# Patient Record
Sex: MALE | Race: WHITE
[De-identification: names, ages, dates, MRNs, and addresses within clinical notes are randomized per-mention and may not be internally consistent; named-entity substitution may affect disease eponyms.]

---

## 2017-09-30 ENCOUNTER — HOSPITAL ENCOUNTER (OUTPATIENT)
Dept: HOSPITAL 75 - LAB | Age: 22
End: 2017-09-30
Attending: NURSE PRACTITIONER
Payer: COMMERCIAL

## 2017-09-30 DIAGNOSIS — J02.9: Primary | ICD-10-CM

## 2017-09-30 LAB
BASOPHILS # BLD AUTO: 0 10^3/UL (ref 0–0.1)
BASOPHILS NFR BLD AUTO: 0 % (ref 0–10)
EOSINOPHIL # BLD AUTO: 0 10^3/UL (ref 0–0.3)
EOSINOPHIL NFR BLD AUTO: 0 % (ref 0–10)
ERYTHROCYTE [DISTWIDTH] IN BLOOD BY AUTOMATED COUNT: 12.9 % (ref 10–14.5)
LYMPHOCYTES # BLD AUTO: 1.2 X 10^3 (ref 1–4)
LYMPHOCYTES NFR BLD AUTO: 11 % (ref 12–44)
MCH RBC QN AUTO: 28 PG (ref 25–34)
MCHC RBC AUTO-ENTMCNC: 33 G/DL (ref 32–36)
MCV RBC AUTO: 84 FL (ref 80–99)
MONOCYTES # BLD AUTO: 0.9 X 10^3 (ref 0–1)
MONOCYTES NFR BLD AUTO: 9 % (ref 0–12)
NEUTROPHILS # BLD AUTO: 8.5 X 10^3 (ref 1.8–7.8)
NEUTROPHILS NFR BLD AUTO: 80 % (ref 42–75)
PLATELET # BLD: 247 10^3/UL (ref 130–400)
PMV BLD AUTO: 9.5 FL (ref 7.4–10.4)
RBC # BLD AUTO: 5.17 10^6/UL (ref 4.35–5.85)
WBC # BLD AUTO: 10.7 10^3/UL (ref 4.3–11)

## 2017-09-30 PROCEDURE — 86308 HETEROPHILE ANTIBODY SCREEN: CPT

## 2017-09-30 PROCEDURE — 36415 COLL VENOUS BLD VENIPUNCTURE: CPT

## 2017-09-30 PROCEDURE — 85025 COMPLETE CBC W/AUTO DIFF WBC: CPT

## 2017-10-01 ENCOUNTER — HOSPITAL ENCOUNTER (OUTPATIENT)
Dept: HOSPITAL 75 - LAB | Age: 22
End: 2017-10-01
Attending: NURSE PRACTITIONER
Payer: COMMERCIAL

## 2017-10-01 DIAGNOSIS — J02.9: Primary | ICD-10-CM

## 2017-10-01 LAB
ALBUMIN SERPL-MCNC: 4.2 GM/DL (ref 3.2–4.5)
ALT SERPL-CCNC: 29 U/L (ref 0–55)
ANION GAP SERPL CALC-SCNC: 11 MMOL/L (ref 5–14)
AST SERPL-CCNC: 30 U/L (ref 5–34)
BILIRUB SERPL-MCNC: 0.4 MG/DL (ref 0.1–1)
BUN SERPL-MCNC: 21 MG/DL (ref 7–18)
BUN/CREAT SERPL: 19
CALCIUM SERPL-MCNC: 9.2 MG/DL (ref 8.5–10.1)
CHLORIDE SERPL-SCNC: 103 MMOL/L (ref 98–107)
CO2 SERPL-SCNC: 24 MMOL/L (ref 21–32)
CREAT SERPL-MCNC: 1.08 MG/DL (ref 0.6–1.3)
GFR SERPLBLD BASED ON 1.73 SQ M-ARVRAT: > 60 ML/MIN
GLUCOSE SERPL-MCNC: 95 MG/DL (ref 70–105)
POTASSIUM SERPL-SCNC: 4.1 MMOL/L (ref 3.6–5)
PROT SERPL-MCNC: 7.6 GM/DL (ref 6.4–8.2)
SODIUM SERPL-SCNC: 138 MMOL/L (ref 135–145)

## 2017-10-01 PROCEDURE — 86665 EPSTEIN-BARR CAPSID VCA: CPT

## 2017-10-01 PROCEDURE — 86663 EPSTEIN-BARR ANTIBODY: CPT

## 2017-10-01 PROCEDURE — 86664 EPSTEIN-BARR NUCLEAR ANTIGEN: CPT

## 2017-10-01 PROCEDURE — 36415 COLL VENOUS BLD VENIPUNCTURE: CPT

## 2017-10-01 PROCEDURE — 80053 COMPREHEN METABOLIC PANEL: CPT

## 2017-10-03 LAB
Lab: (no result) U/ML (ref 0–17.9)
Lab: <5 U/ML (ref 0–8.9)

## 2018-01-07 ENCOUNTER — HOSPITAL ENCOUNTER (EMERGENCY)
Dept: HOSPITAL 75 - ER | Age: 23
Discharge: HOME | End: 2018-01-07
Payer: COMMERCIAL

## 2018-01-07 VITALS — WEIGHT: 170 LBS | HEIGHT: 73 IN | BODY MASS INDEX: 22.53 KG/M2

## 2018-01-07 VITALS — SYSTOLIC BLOOD PRESSURE: 148 MMHG | DIASTOLIC BLOOD PRESSURE: 99 MMHG

## 2018-01-07 DIAGNOSIS — B34.9: Primary | ICD-10-CM

## 2018-01-07 LAB
ALBUMIN SERPL-MCNC: 4.3 GM/DL (ref 3.2–4.5)
ALP SERPL-CCNC: 58 U/L (ref 40–136)
ALT SERPL-CCNC: 14 U/L (ref 0–55)
BASOPHILS # BLD AUTO: 0 10^3/UL (ref 0–0.1)
BASOPHILS NFR BLD AUTO: 0 % (ref 0–10)
BILIRUB SERPL-MCNC: 0.3 MG/DL (ref 0.1–1)
BUN/CREAT SERPL: 23
CALCIUM SERPL-MCNC: 9.3 MG/DL (ref 8.5–10.1)
CHLORIDE SERPL-SCNC: 102 MMOL/L (ref 98–107)
CO2 SERPL-SCNC: 26 MMOL/L (ref 21–32)
CREAT SERPL-MCNC: 1.06 MG/DL (ref 0.6–1.3)
EOSINOPHIL # BLD AUTO: 0.1 10^3/UL (ref 0–0.3)
EOSINOPHIL NFR BLD AUTO: 1 % (ref 0–10)
ERYTHROCYTE [DISTWIDTH] IN BLOOD BY AUTOMATED COUNT: 12.5 % (ref 10–14.5)
GFR SERPLBLD BASED ON 1.73 SQ M-ARVRAT: > 60 ML/MIN
GLUCOSE SERPL-MCNC: 98 MG/DL (ref 70–105)
HCT VFR BLD CALC: 48 % (ref 40–54)
HGB BLD-MCNC: 15.4 G/DL (ref 13.3–17.7)
LYMPHOCYTES # BLD AUTO: 1.7 X 10^3 (ref 1–4)
LYMPHOCYTES NFR BLD AUTO: 22 % (ref 12–44)
MANUAL DIFFERENTIAL PERFORMED BLD QL: NO
MCH RBC QN AUTO: 27 PG (ref 25–34)
MCHC RBC AUTO-ENTMCNC: 32 G/DL (ref 32–36)
MCV RBC AUTO: 86 FL (ref 80–99)
MONOCYTES # BLD AUTO: 0.8 X 10^3 (ref 0–1)
MONOCYTES NFR BLD AUTO: 10 % (ref 0–12)
NEUTROPHILS # BLD AUTO: 5.2 X 10^3 (ref 1.8–7.8)
NEUTROPHILS NFR BLD AUTO: 67 % (ref 42–75)
PLATELET # BLD: 235 10^3/UL (ref 130–400)
PMV BLD AUTO: 9.7 FL (ref 7.4–10.4)
POTASSIUM SERPL-SCNC: 3.9 MMOL/L (ref 3.6–5)
PROT SERPL-MCNC: 7.9 GM/DL (ref 6.4–8.2)
RBC # BLD AUTO: 5.63 10^6/UL (ref 4.35–5.85)
SODIUM SERPL-SCNC: 140 MMOL/L (ref 135–145)
WBC # BLD AUTO: 7.8 10^3/UL (ref 4.3–11)

## 2018-01-07 PROCEDURE — 99284 EMERGENCY DEPT VISIT MOD MDM: CPT

## 2018-01-07 PROCEDURE — 87040 BLOOD CULTURE FOR BACTERIA: CPT

## 2018-01-07 PROCEDURE — 36415 COLL VENOUS BLD VENIPUNCTURE: CPT

## 2018-01-07 PROCEDURE — 85025 COMPLETE CBC W/AUTO DIFF WBC: CPT

## 2018-01-07 PROCEDURE — 80053 COMPREHEN METABOLIC PANEL: CPT

## 2018-01-07 PROCEDURE — 71046 X-RAY EXAM CHEST 2 VIEWS: CPT

## 2018-01-07 PROCEDURE — 87430 STREP A AG IA: CPT

## 2018-01-07 NOTE — DIAGNOSTIC IMAGING REPORT
EXAMINATION: PA and lateral chest



INDICATION: Cough and sore throat



FINDINGS: The lungs demonstrate no focal infiltrate or evidence

of an effusion though there does appear to be some mild

hyperexpansion. Heart size is normal. There is no pneumothorax.

There is no evidence of failure. There is no osseous abnormality.



IMPRESSION:

1. The lungs appear hyperinflated suggesting air trapping. This

could be seen in the setting of bronchitis or related to asthma.

No alveolar infiltrates are evident.



Dictated by: 



  Dictated on workstation # [id number]

## 2018-01-07 NOTE — ED COUGH/URI
General


Chief Complaint:  Cough/Cold/Flu Symptoms


Stated Complaint:  SORE THROAT,CONGESTION


Nursing Triage Note:  


PT TO ED 9 W/ C/O COUGH, SORE THROAT X1 WK.  REPORTS WAS SEEN AT [facility], PRESCRIBED TAMIFLU ET PREDNISONE.  STATES STILL WASN'T FEELING BETTER THE 


NEXT DAY SO HE WAS SEEN AT .  REPORTS HE WAS TAKEN OFF 

TAMIFLU 


ET PREDNISONE ET PUT ON CECLOR.  DENIES IMPROVEMENT.  DOES REPORT HE'S TAKEN 

ONE 


DOSE OF MUCINEX "BUT IT DIDN'T HELP" AND HAS TAKEN TYLENOL ONLY FOR FEVER


Source:  patient


Exam Limitations:  no limitations





History of Present Illness


Time seen by provider:  19:51


Initial Comments


To ER with sore throat cough and congestion that began on Tuesday of this last 

week which would be 1/2/18. He was seen at [facility] here in 

[place] and treated empirically with Tamiflu. He was unsatisfied not being 

tested for influenza so he went to [facility] he was swabbed for 

influenza test is negative. He went home and took the Tamiflu and prednisone 

for 1 day for denied improvement. Went back to [facility] on Wednesday was 

told to stop the prednisone and the Tamiflu and was put on Ceclor. Still denies 

improvement and complains of significant pain with swallowing. He did test 

positive for both mono and specifically the Daniel-Barr virus in September and 

November 2016 respectively. However he does not believe that he has mono 

because he states he can playful basketball game without being exhausted.


Timing/Duration:  week


Severity/Quality:  dry cough


Associated Symptoms:  cough, fever/chills





Allergies and Home Medications


Allergies


Coded Allergies:  


     No Known Drug Allergies (Unverified , 1/7/18)





Constitutional:  see HPI


EENTM:  see HPI, nose pain


Respiratory:  see HPI, cough


Cardiovascular:  no symptoms reported





Past Medical-Social-Family Hx


Patient Social History


Alcohol Use:  Occasionally Uses


Recreational Drug Use:  No


Smoking Status:  Never a Smoker


Recent Foreign Travel:  No


Contact w/Someone Who Travel:  No


Recent Infectious Disease Expo:  No


Recent Hopitalizations:  No


Physical Abuse:  No


Sexual Abuse:  No


Mistreated:  No


Fear:  No





Surgeries


History of Surgeries:  Yes (CYST REMOVAL TO WRIST)





Respiratory


History of Respiratory Disorde:  No





Cardiovascular


History of Cardiac Disorders:  No





Neurological


History of Neurological Disord:  No





Genitourinary


History of Genitourinary Disor:  No





Gastrointestinal


History of Gastrointestinal Di:  No





Musculoskeletal


History of Musculoskeletal Dis:  No





Endocrine


History of Endocrine Disorders:  No





HEENT


History of HEENT Disorders:  No





Cancer


History of Cancer:  No





Psychosocial


History of Psychiatric Problem:  No


Suicide Risk Score:  0





Blood Transfusions


History of Blood Disorders:  No





Physical Exam


Vital Signs





Vital Sign - Last 12Hours








 1/7/18





 19:35


 


Temp 100.8


 


Pulse 85


 


Resp 16


 


B/P (MAP) 152/105 (121)


 


Pulse Ox 97


 


O2 Delivery Room Air





Capillary Refill : Less Than 3 Seconds


General Appearance:  WD/WN, no apparent distress


Eyes:  Bilateral Eye Normal Inspection, Bilateral Eye PERRL, Bilateral Eye EOMI


HEENT:  PERRL/EOMI, normal ENT inspection, other (ulcer on the uvula with 

erythema, ulcers x3 on left tonsillar pillar. No swelling to suggest 

peritonsillar abscess. )


Neck:  lymphadenopathy (R), lymphadenopathy (L)


Respiratory:  normal breath sounds, no respiratory distress, no accessory 

muscle use


Cardiovascular:  regular rate, rhythm, no murmur


Gastrointestinal:  normal bowel sounds, non tender, soft


Extremities:  normal range of motion, non-tender


Neurologic/Psychiatric:  alert, normal mood/affect, oriented x 3


Skin:  normal color, warm/dry





Progress/Results/Core Measures


Suspected Sepsis


Recent Fever Within 48 Hours:  Yes


Infection Criteria Present:  None


New/Unexplained  Altered Menta:  No


Sepsis Screen:  No Definite Risk


Sepsis Diagnosis:  


SIRS


Temperature:100.8 


Pulse: 85 


Respiratory Rate: 16


 


Laboratory Tests


1/7/18 20:00: White Blood Count 7.8


Blood Pressure 152 /105 


Mean: 121


 





Laboratory Tests


1/7/18 20:00: 


Creatinine 1.06, Platelet Count 235, Total Bilirubin 0.3








Results/Orders


Lab Results





Laboratory Tests








Test


  1/7/18


19:44 1/7/18


20:00 Range/Units


 


 


Group A Streptococcus Screen NEGATIVE   NEGATIVE  


 


White Blood Count


  


  7.8 


  4.3-11.0


10^3/uL


 


Red Blood Count


  


  5.63 


  4.35-5.85


10^6/uL


 


Hemoglobin  15.4  13.3-17.7  G/DL


 


Hematocrit  48  40-54  %


 


Mean Corpuscular Volume  86  80-99  FL


 


Mean Corpuscular Hemoglobin  27  25-34  PG


 


Mean Corpuscular Hemoglobin


Concent 


  32 


  32-36  G/DL


 


 


Red Cell Distribution Width  12.5  10.0-14.5  %


 


Platelet Count


  


  235 


  130-400


10^3/uL


 


Mean Platelet Volume  9.7  7.4-10.4  FL


 


Neutrophils (%) (Auto)  67  42-75  %


 


Lymphocytes (%) (Auto)  22  12-44  %


 


Monocytes (%) (Auto)  10  0-12  %


 


Eosinophils (%) (Auto)  1  0-10  %


 


Basophils (%) (Auto)  0  0-10  %


 


Neutrophils # (Auto)  5.2  1.8-7.8  X 10^3


 


Lymphocytes # (Auto)  1.7  1.0-4.0  X 10^3


 


Monocytes # (Auto)  0.8  0.0-1.0  X 10^3


 


Eosinophils # (Auto)


  


  0.1 


  0.0-0.3


10^3/uL


 


Basophils # (Auto)


  


  0.0 


  0.0-0.1


10^3/uL


 


Sodium Level  140  135-145  MMOL/L


 


Potassium Level  3.9  3.6-5.0  MMOL/L


 


Chloride Level  102    MMOL/L


 


Carbon Dioxide Level  26  21-32  MMOL/L


 


Anion Gap  12  5-14  MMOL/L


 


Blood Urea Nitrogen  24 H 7-18  MG/DL


 


Creatinine


  


  1.06 


  0.60-1.30


MG/DL


 


Estimat Glomerular Filtration


Rate 


  > 60 


   


 


 


BUN/Creatinine Ratio  23   


 


Glucose Level  98    MG/DL


 


Calcium Level  9.3  8.5-10.1  MG/DL


 


Total Bilirubin  0.3  0.1-1.0  MG/DL


 


Aspartate Amino Transf


(AST/SGOT) 


  18 


  5-34  U/L


 


 


Alanine Aminotransferase


(ALT/SGPT) 


  14 


  0-55  U/L


 


 


Alkaline Phosphatase  58    U/L


 


Total Protein  7.9  6.4-8.2  GM/DL


 


Albumin  4.3  3.2-4.5  GM/DL








My Orders





Orders - ANNI WARD


Chest Pa/Lat (2 View) (1/7/18 19:41)


Rapid Strep A Screen (1/7/18 19:41)


Cbc With Automated Diff (1/7/18 19:41)


Comprehensive Metabolic Panel (1/7/18 19:47)


Ibuprofen  Tablet (Motrin  Tablet) (1/7/18 20:15)





Vital Signs/I&O





Vital Sign - Last 12Hours








 1/7/18





 19:35


 


Temp 100.8


 


Pulse 85


 


Resp 16


 


B/P (MAP) 152/105 (121)


 


Pulse Ox 97


 


O2 Delivery Room Air





Capillary Refill : Less Than 3 Seconds








Blood Pressure Mean:  121











Departure


Impression


Impression:  


 Primary Impression:  


 Viral syndrome


Disposition:  01 HOME, SELF-CARE


Condition:  Stable





Departure-Patient Inst.


Decision time for Depature:  20:15


Referrals:  


NO,LOCAL PHYSICIAN (PCP/Family)


Primary Care Physician


Patient Instructions:  VIRAL SYNDROME





Add. Discharge Instructions:  


1. Ibuprofen 800mg every 8 hours for pain and fever. Tylenol 650mg every 4 

hours for pain and fever. Cepacol sore throat lozenges (you can buy these at 

[facility]. They have benzocaine in them and will help with the pain). ALso use 

warm tea mixed with honey to help soothe the throat. Continue current 

medicaitions. No class for 48 hours, stay indoors and rest. No sports or 

working out until saturday. 


All discharge instructions reviewed with patient and/or family. Voiced 

understanding.


Work/School Note:  Work Release Form   Date Seen in the Emergency Department:  

Jan 7, 2018


   Return to Work:  Jan 9, 2018


      Other Restrictions Listed Below:  No Sports until 1/13/18











ANNI WARD Jan 7, 2018 19:53